# Patient Record
Sex: MALE | Race: WHITE
[De-identification: names, ages, dates, MRNs, and addresses within clinical notes are randomized per-mention and may not be internally consistent; named-entity substitution may affect disease eponyms.]

---

## 2018-04-13 ENCOUNTER — HOSPITAL ENCOUNTER (INPATIENT)
Dept: HOSPITAL 46 - MS | Age: 58
LOS: 12 days | Discharge: HOME | DRG: 483 | End: 2018-04-25
Attending: ORTHOPAEDIC SURGERY | Admitting: ORTHOPAEDIC SURGERY
Payer: COMMERCIAL

## 2018-04-13 VITALS — WEIGHT: 175 LBS | HEIGHT: 69 IN | BODY MASS INDEX: 25.92 KG/M2

## 2018-04-13 DIAGNOSIS — Z79.899: ICD-10-CM

## 2018-04-13 DIAGNOSIS — M19.011: Primary | ICD-10-CM

## 2018-04-13 DIAGNOSIS — Z79.82: ICD-10-CM

## 2018-04-13 DIAGNOSIS — F17.220: ICD-10-CM

## 2018-04-13 DIAGNOSIS — G89.18: ICD-10-CM

## 2018-04-13 DIAGNOSIS — Z95.1: ICD-10-CM

## 2018-04-13 DIAGNOSIS — I10: ICD-10-CM

## 2018-04-13 PROCEDURE — C1713 ANCHOR/SCREW BN/BN,TIS/BN: HCPCS

## 2018-04-13 PROCEDURE — C1776 JOINT DEVICE (IMPLANTABLE): HCPCS

## 2018-04-24 PROCEDURE — 0RRJ0J6 REPLACEMENT OF RIGHT SHOULDER JOINT WITH SYNTHETIC SUBSTITUTE, HUMERAL SURFACE, OPEN APPROACH: ICD-10-PCS | Performed by: ORTHOPAEDIC SURGERY

## 2018-04-24 PROCEDURE — 3E0T3BZ INTRODUCTION OF ANESTHETIC AGENT INTO PERIPHERAL NERVES AND PLEXI, PERCUTANEOUS APPROACH: ICD-10-PCS

## 2018-04-24 NOTE — NUR
PATIENT RESTING COMFORTABLY IN BED, BREATHING IS EVEN AND UNLABORED. FLACC
SCORE OF 0. CALL LIGHT WITHIN REACH, ALL ORDERS IN PLACE.

## 2018-04-24 NOTE — NUR
RECEIVED REPORT FROM RN. PATIENT RESTING IN BED, BREATHING IS EVEN AND
UNLABORED. O2 SATURATION IS 97% ON ROOM AIR. DENIES PAIN. CALL LIGHT WITHIN
REACH, ALL ORDERS IN PLACE.

## 2018-04-24 NOTE — NUR
PATIENT ARRIVED TO FLOOR AT 1420 FROM PACU. RIGHT ARM IN SLING. SHOULDER
DRESSING HAS DURABOND, MEPILEX, AND OPSITE IN PLACE. NO BREAKTHROUGH DRAINAGE
SEEN. WIFE TO STAY NIGHT WITH PATIENT. ROOM AIR. SALINE LOCKED. TOLERATED
REGULAR DIET. STANDBY ASSIST-- MAY NEED BED ALARM TONIGHT (PATIENT LIKELY TO
GET UP WITHOUT CALLING FOR HELP). SLIGHTLY UNSTEADY ON FEET. TORADOL/TYLENOL
SCHEDULED. ICE PACKS FOR SWELLING/COMFORT.

## 2018-04-24 NOTE — NUR
pt awake and tolerating clear liquids and crackers. regular diet ordered for
patient. wife and friend at bedside. pain 0/10. numb and weak right arm.

## 2018-04-24 NOTE — NUR
PHYSICAL THERAPY WORKING WITH PATIENT NOW. IV INFILTRATED. WILL GET NEW IV
INSERTED AFTER PHYSICAL THERAPY DONE WITH PATIENT. TRANEXEMIC ACID LATE D/T IV
INFILTRATION.

## 2018-04-24 NOTE — NUR
04/24/18 1337 Virginia Parsons
1321 PT ARRIVED TO PACU, WITH ORAL IN PLACE ON 10L VIA MASK, O2 SAT
100%. O2 MASK DECREASED TO 8L.
 
1325 PT WOKE UP TO STIMULI, PT OPENED HIS MOUTH AND ORAL AIRWAY WAS
RMEOVED. PT REACHED UP AND PULLED MASK OFF. PT MOVING IN BAD UNABLE TO
FOLLOW COMMANDS, PT RESTLESS.
 
1330 PT ASLEEP, SNORING NOTED. PT MAINTAINING OWN AIRWAY. ICE IN PLACE
ON SHOULDER. SLING IN PLACE FORM OR.

## 2018-04-24 NOTE — NUR
Patient sitting up on bedside. Patient up to bathroom, back to bedside. RN in
room. Family in room. Call light in reach. No other needs at this time.

## 2018-04-24 NOTE — NUR
PATIENT RESTING IN BED, EYES CLOSED. RN STATES VITALS WERE ALREADY TAKEN.
PATIENTS FAMILY IN ROOM. FRESH ICE WATER ON BEDSIDE TABLE. URINAL ON BEDSIDE
TABLE PER PATIENTS REQUEST. CALL LIGHT IN REACH. NO OTHER NEEDS AT THIS TIME.

## 2018-04-24 NOTE — NUR
PATIENT RESTING IN BED, BREATHING IS EVEN AND UNLABORED. DENIES NEEDS AT THIS
TIME, REPORTS 0/10 PAIN. CALL LIGHT WITHIN REACH.

## 2018-04-25 NOTE — NUR
PATIENT RESTING IN BED, BREATHING IS EVEN AND UNLABORED. DENIES NEEDS AT THIS
TIME, REPORTS 0/10 PAIN. CALL LIGHT WITHIN REACH, ALL ORDERS IN PLACE.

## 2018-04-25 NOTE — NUR
PATIENT RESTING IN BED, DC EDUCATION DONE. PATIENT WAS MEDICATEDFOR RIGHT
SHOULDER PAIN. WATING FOR PHARMACY FOR MEDICATION INSTRUCTION BEFORE DC.

## 2018-04-25 NOTE — NUR
PATIENT RESTING COMFORTABLY IN BED, BREATHING IS EVEN AND UNLABORED. DENIES
PAIN AT THIS TIME. DENIES NEEDS. CALL LIGHT WITHIN REACH.

## 2018-04-25 NOTE — NUR
PATIENT'S NIGHT WAS UNEVENTFUL. HE HAS BEEN RESTING OFF AND ON THROUGHOUT
SHIFT. VSS, URINE OUTPUT QS. PAIN HAS BEEN 0/10 THROUGHOUT SHIFT. ASSESSMENT
IS BENIGN, SCANT AMOUNT OF DRAINAGE FROM DRESSING TO RIGHT SHOULDER NOTED,
SIZE OF PENCIL ERASER. NUMBNESS AND TINGLING IN RIGHT ARM IMPROVING, HAS
SENSATION FROM MID FOREARM TO FINGERS. HAS SLING TO RIGHT ARM IN PLACE. IV IS
SALINE LOCKED, SBA, TOLERATING REGULAR DIET. NO ACUTE CHAGNES FROM BEGINNING
OF SHIFT.

## 2018-04-25 NOTE — NUR
Patient sitting up in bed. PAtient stated he would not like a wipe down today
because he would like to do so at home today instead. Bed bath wipes and clean
gown set up in bathroom for patient. Oral care and clean wash cloth for face
and hands set up in bathroom at patients request. Fresh ice water at bedside
table. Call light in reach. No other needs at this time.

## 2018-04-25 NOTE — NUR
PATIENT DRESSED IN PERSONAL CLOTHES, WAITING FOR DISCHARGE INSTRUCTIONS. FRESH
ICE WATER ON BEDSIDE TABLE. CALL LIGHT IN REACH. NO OTHER NEEDS AT THIS TIME.

## 2018-04-25 NOTE — NUR
REPORT RECEIVED FROM ARON. ASSUMING PATIENT'S CARE AT THIS TIME. PATIENT
RESTING IN BED AWAKE ALERT AND ORIENTED TO ALL. PATIENT REPORT NUMBNESS AND
TINGLING IN THE RIGHT ARM,THE THUMB AND THE INDEX FINGERS OF THE LEFT
HAND.CALL LIGHT IN REACH.

## 2018-04-25 NOTE — NUR
PATIENT SITTING UP IN BED. RN IN ROOM. VITALS AND I&OS TAKEN, FRESG ICE WATER
AT BEDSIDE TABLE, CALL LIGHT IN REACH. NO OTHER NEEDS AT THIS TIME.

## 2018-04-25 NOTE — OR
[facility]
                                    [address], [place]  [address]
_________________________________________________________________________________________
                                                                 Signed   
 
 
DATE OF OPERATION:
04/24/2018
 
SURGEON:
Janeen Be MD
 
PREOPERATIVE DIAGNOSIS:
End-stage osteoarthritis right shoulder.
 
POSTOPERATIVE DIAGNOSIS:
End-stage osteoarthritis right shoulder.
 
PROCEDURE:
Hemiarthroplasty right shoulder.
 
ANESTHESIA:
Block with sedation.
 
SPECIMENS:
There were no specimens.
 
COMPLICATIONS:
No complications.
 
BLOOD LOSS:
About 250 mL.
 
WHAT WAS DONE:
The patient was taken to the operating room.  After anesthesia was induced and airway
secured, the patient was positioned, prepped and draped in a routine sterile fashion.
An anterior deltopectoral incision was made through skin and subcutaneous tissue.
Hemostasis was achieved with electrocautery.  We then used blunt dissection to identify
the deltopectoral interval.  The cephalic vein was identified and retracted laterally
with the deltoid.  We then released the clavipectoral fascia.  Because of the marked
loss of external rotation, released the upper 3rd of the pec major tendon right off the
bone.  We then gently mobilized the subscapularis off the lesser tuberosity and placed
some stay sutures in its leading edge.  We allowed it to retract and then did an
anterior capsulectomy.  By externally rotating the arm, we were able gently used sharp
and blunt dissection to peel the capsule off the large osteophyte underneath the humeral
head.  We were then able to deliver the humeral head into the wound.  Using a hand
reamers, we reamed the humerus up to a size 14.  We then placed the proximal resection
jig on and used to do a proximal humeral head resection.  The resected portion measured
 
    Electronically Signed By: JANEEN BE MD  04/25/18 0945
_________________________________________________________________________________________
PATIENT NAME:     BEERS,KEVIN DUANE                     
MEDICAL RECORD #: [id number]            OPERATIVE REPORT              
          ACCT #: [id number]  
DATE OF BIRTH:   [birth year]            REPORT #: [id number]      
PHYSICIAN:        JANEEN BE MD      
PCP:              LAYO OVERTON MD      
REPORT IS CONFIDENTIAL AND NOT TO BE RELEASED WITHOUT AUTHORIZATION
 
 
                                  [facility]
                                    [address], [place]  [address]
_________________________________________________________________________________________
                                                                 Signed   
 
 
18 x 52 mm.  We then delivered the proximal humerus in the wound and removed all the
medial and posterior osteophytes.  We then gently released the capsule off the glenoid
side all the way down to the 6 o'clock position.  This gave us excellent visualization
of the glenoid, which appeared to be the remarkably unaffected.  There was good
articular cartilage almost everywhere.  We therefore elected not to proceed with the
glenoid replacement.  We then returned to the proximal humerus.  It was prepared with a
standard broach and reamer maintaining about 25 degrees of retroversion, which is where
the cut had been made.  We had excellent fit and fill with a 14 mm implant trial.  We
then reduced it with an extent within eccentric 18 x 52 mm head and we were happy with
the coverage, the alignment, the position, and stability.  We then removed the trials.
As removed the trials are appeared to be a small crack in the anterior cortex.  We
therefore passed a single cerclage wire just underneath the rotator cuff insertion all
the way around and secured it.  We then able to pass the final implant again with good
alignment, good position, and excellent stability.  We then trialed and were happy with
the 21 x 52 head giving us a little bit better stability and not compromising a motion.
We therefore placed the eccentric head on the Romero taper.  We located the shoulder.
The wound was copiously irrigated and closed in a standard fashion.  Sterile dressings
were applied.  The patient was placed in a sling, awakened, and taken to the recovery
room where he arrived in stable condition.  Counts were correct and antibiotic protocols
were followed. 
 
 
 
            ________________________________________
            Janeen Be MD 
 
 
[facility]
Job #:  [id number]
DD:  04/24/2018 13:24:53
DT:  04/24/2018 19:30:19
 
 
Copies:                                
~
 
 
 
 
 
 
 
 
    Electronically Signed By: JANEEN BE MD  04/25/18 0945
_________________________________________________________________________________________
PATIENT NAME:     BEERS,KEVIN DUANE                     
MEDICAL RECORD #: [id number]            OPERATIVE REPORT              
          ACCT #: [id number]  
DATE OF BIRTH:   [birth year]            REPORT #: [id number]      
PHYSICIAN:        JANEEN BE MD      
PCP:              LAYO OVERTON MD      
REPORT IS CONFIDENTIAL AND NOT TO BE RELEASED WITHOUT AUTHORIZATION

## 2018-04-26 NOTE — NUR
FAXED CHART NOTES INCLUDING FACESHEET, ORDER, H AND P, PROG NOTES, OP NOTE AND
PT EVAL AND NOTES TO [facility].  RECIEVED A FAX CONFIRMATION.